# Patient Record
Sex: FEMALE | Race: WHITE | NOT HISPANIC OR LATINO | URBAN - METROPOLITAN AREA
[De-identification: names, ages, dates, MRNs, and addresses within clinical notes are randomized per-mention and may not be internally consistent; named-entity substitution may affect disease eponyms.]

---

## 2018-03-14 ENCOUNTER — RECORDS - HEALTHEAST (OUTPATIENT)
Dept: LAB | Facility: CLINIC | Age: 83
End: 2018-03-14

## 2018-03-15 LAB
ANION GAP SERPL CALCULATED.3IONS-SCNC: 6 MMOL/L (ref 5–18)
BASOPHILS # BLD AUTO: 0 THOU/UL (ref 0–0.2)
BASOPHILS NFR BLD AUTO: 0 % (ref 0–2)
BUN SERPL-MCNC: 18 MG/DL (ref 8–28)
CALCIUM SERPL-MCNC: 9.1 MG/DL (ref 8.5–10.5)
CHLORIDE BLD-SCNC: 98 MMOL/L (ref 98–107)
CHOLEST SERPL-MCNC: 114 MG/DL
CO2 SERPL-SCNC: 31 MMOL/L (ref 22–31)
CREAT SERPL-MCNC: 0.97 MG/DL (ref 0.6–1.1)
EOSINOPHIL # BLD AUTO: 0.3 THOU/UL (ref 0–0.4)
EOSINOPHIL NFR BLD AUTO: 3 % (ref 0–6)
ERYTHROCYTE [DISTWIDTH] IN BLOOD BY AUTOMATED COUNT: 16.5 % (ref 11–14.5)
FASTING STATUS PATIENT QL REPORTED: ABNORMAL
GFR SERPL CREATININE-BSD FRML MDRD: 54 ML/MIN/1.73M2
GLUCOSE BLD-MCNC: 69 MG/DL (ref 70–125)
HCT VFR BLD AUTO: 36.4 % (ref 35–47)
HDLC SERPL-MCNC: 35 MG/DL
HGB BLD-MCNC: 11.5 G/DL (ref 12–16)
LDLC SERPL CALC-MCNC: 64 MG/DL
LYMPHOCYTES # BLD AUTO: 1.7 THOU/UL (ref 0.8–4.4)
LYMPHOCYTES NFR BLD AUTO: 18 % (ref 20–40)
MCH RBC QN AUTO: 30.1 PG (ref 27–34)
MCHC RBC AUTO-ENTMCNC: 31.6 G/DL (ref 32–36)
MCV RBC AUTO: 95 FL (ref 80–100)
MONOCYTES # BLD AUTO: 0.9 THOU/UL (ref 0–0.9)
MONOCYTES NFR BLD AUTO: 9 % (ref 2–10)
NEUTROPHILS # BLD AUTO: 6.4 THOU/UL (ref 2–7.7)
NEUTROPHILS NFR BLD AUTO: 70 % (ref 50–70)
PLATELET # BLD AUTO: 309 THOU/UL (ref 140–440)
PMV BLD AUTO: 9.2 FL (ref 8.5–12.5)
POTASSIUM BLD-SCNC: 4.6 MMOL/L (ref 3.5–5)
RBC # BLD AUTO: 3.82 MILL/UL (ref 3.8–5.4)
SODIUM SERPL-SCNC: 135 MMOL/L (ref 136–145)
TRIGL SERPL-MCNC: 77 MG/DL
TSH SERPL DL<=0.005 MIU/L-ACNC: 2.83 UIU/ML (ref 0.3–5)
VIT B12 SERPL-MCNC: 371 PG/ML (ref 213–816)
WBC: 9.3 THOU/UL (ref 4–11)

## 2018-03-16 LAB — 25(OH)D3 SERPL-MCNC: 41.5 NG/ML (ref 30–80)

## 2018-04-06 ENCOUNTER — RECORDS - HEALTHEAST (OUTPATIENT)
Dept: LAB | Facility: CLINIC | Age: 83
End: 2018-04-06

## 2018-04-06 LAB
FERRITIN SERPL-MCNC: 216 NG/ML (ref 10–130)
HGB BLD-MCNC: 11.9 G/DL (ref 12–16)
IRON SATN MFR SERPL: 16 % (ref 20–50)
IRON SERPL-MCNC: 40 UG/DL (ref 42–175)
TIBC SERPL-MCNC: 256 UG/DL (ref 313–563)
TRANSFERRIN SERPL-MCNC: 205 MG/DL (ref 212–360)

## 2018-05-08 ENCOUNTER — RECORDS - HEALTHEAST (OUTPATIENT)
Dept: LAB | Facility: CLINIC | Age: 83
End: 2018-05-08

## 2018-05-09 LAB — HGB BLD-MCNC: 13.2 G/DL (ref 12–16)

## 2018-07-14 ENCOUNTER — RECORDS - HEALTHEAST (OUTPATIENT)
Dept: LAB | Facility: CLINIC | Age: 83
End: 2018-07-14

## 2018-07-14 LAB
ALBUMIN UR-MCNC: ABNORMAL MG/DL
APPEARANCE UR: ABNORMAL
BACTERIA #/AREA URNS HPF: ABNORMAL HPF
BILIRUB UR QL STRIP: NEGATIVE
CAOX CRY #/AREA URNS HPF: PRESENT /[HPF]
COLOR UR AUTO: YELLOW
GLUCOSE UR STRIP-MCNC: NEGATIVE MG/DL
HGB UR QL STRIP: NEGATIVE
KETONES UR STRIP-MCNC: NEGATIVE MG/DL
LEUKOCYTE ESTERASE UR QL STRIP: ABNORMAL
MUCOUS THREADS #/AREA URNS LPF: ABNORMAL LPF
NITRATE UR QL: NEGATIVE
PH UR STRIP: 6.5 [PH] (ref 4.5–8)
RBC #/AREA URNS AUTO: ABNORMAL HPF
SP GR UR STRIP: 1.02 (ref 1–1.03)
SQUAMOUS #/AREA URNS AUTO: ABNORMAL LPF
UROBILINOGEN UR STRIP-ACNC: ABNORMAL
WBC #/AREA URNS AUTO: ABNORMAL HPF

## 2018-07-15 LAB — BACTERIA SPEC CULT: NORMAL

## 2018-07-16 ENCOUNTER — RECORDS - HEALTHEAST (OUTPATIENT)
Dept: LAB | Facility: CLINIC | Age: 83
End: 2018-07-16

## 2018-07-17 LAB
ALBUMIN SERPL-MCNC: 3 G/DL (ref 3.5–5)
ALP SERPL-CCNC: 73 U/L (ref 45–120)
ALT SERPL W P-5'-P-CCNC: <9 U/L (ref 0–45)
ANION GAP SERPL CALCULATED.3IONS-SCNC: 9 MMOL/L (ref 5–18)
AST SERPL W P-5'-P-CCNC: 16 U/L (ref 0–40)
BILIRUB SERPL-MCNC: 0.5 MG/DL (ref 0–1)
BUN SERPL-MCNC: 17 MG/DL (ref 8–28)
CALCIUM SERPL-MCNC: 9.8 MG/DL (ref 8.5–10.5)
CHLORIDE BLD-SCNC: 97 MMOL/L (ref 98–107)
CO2 SERPL-SCNC: 31 MMOL/L (ref 22–31)
CREAT SERPL-MCNC: 0.92 MG/DL (ref 0.6–1.1)
ERYTHROCYTE [DISTWIDTH] IN BLOOD BY AUTOMATED COUNT: 15.7 % (ref 11–14.5)
GFR SERPL CREATININE-BSD FRML MDRD: 57 ML/MIN/1.73M2
GLUCOSE BLD-MCNC: 62 MG/DL (ref 70–125)
HCT VFR BLD AUTO: 38.9 % (ref 35–47)
HGB BLD-MCNC: 12.8 G/DL (ref 12–16)
MCH RBC QN AUTO: 31.4 PG (ref 27–34)
MCHC RBC AUTO-ENTMCNC: 32.9 G/DL (ref 32–36)
MCV RBC AUTO: 96 FL (ref 80–100)
PLATELET # BLD AUTO: 291 THOU/UL (ref 140–440)
PMV BLD AUTO: 9.5 FL (ref 8.5–12.5)
POTASSIUM BLD-SCNC: 4.2 MMOL/L (ref 3.5–5)
PROT SERPL-MCNC: 7.1 G/DL (ref 6–8)
RBC # BLD AUTO: 4.07 MILL/UL (ref 3.8–5.4)
SODIUM SERPL-SCNC: 137 MMOL/L (ref 136–145)
TSH SERPL DL<=0.005 MIU/L-ACNC: 2.52 UIU/ML (ref 0.3–5)
WBC: 9.2 THOU/UL (ref 4–11)

## 2018-08-15 ENCOUNTER — RECORDS - HEALTHEAST (OUTPATIENT)
Dept: ADMINISTRATIVE | Facility: OTHER | Age: 83
End: 2018-08-15

## 2018-09-05 ENCOUNTER — RECORDS - HEALTHEAST (OUTPATIENT)
Dept: ADMINISTRATIVE | Facility: OTHER | Age: 83
End: 2018-09-05

## 2018-09-14 ENCOUNTER — RECORDS - HEALTHEAST (OUTPATIENT)
Dept: ADMINISTRATIVE | Facility: OTHER | Age: 83
End: 2018-09-14

## 2018-09-26 ENCOUNTER — RECORDS - HEALTHEAST (OUTPATIENT)
Dept: ADMINISTRATIVE | Facility: OTHER | Age: 83
End: 2018-09-26

## 2018-10-01 ENCOUNTER — OFFICE VISIT - HEALTHEAST (OUTPATIENT)
Dept: PULMONOLOGY | Facility: OTHER | Age: 83
End: 2018-10-01

## 2018-10-01 DIAGNOSIS — J47.9 BRONCHIECTASIS WITHOUT ACUTE EXACERBATION (H): ICD-10-CM

## 2018-10-01 RX ORDER — ESCITALOPRAM OXALATE 5 MG/1
5 TABLET ORAL
Status: SHIPPED | COMMUNITY
Start: 2018-10-01

## 2018-10-01 RX ORDER — KETOCONAZOLE 20 MG/ML
1 SHAMPOO TOPICAL WEEKLY
Status: SHIPPED | COMMUNITY
Start: 2018-10-01

## 2018-10-01 RX ORDER — TRIMETHOPRIM 100 MG/1
100 TABLET ORAL DAILY
Status: SHIPPED | COMMUNITY
Start: 2018-10-01

## 2018-10-01 ASSESSMENT — MIFFLIN-ST. JEOR: SCORE: 772.37

## 2018-10-02 ENCOUNTER — COMMUNICATION - HEALTHEAST (OUTPATIENT)
Dept: PULMONOLOGY | Facility: OTHER | Age: 83
End: 2018-10-02

## 2018-10-08 LAB — BACTERIA SPEC CULT: ABNORMAL

## 2018-10-09 ENCOUNTER — RECORDS - HEALTHEAST (OUTPATIENT)
Dept: ADMINISTRATIVE | Facility: OTHER | Age: 83
End: 2018-10-09

## 2018-10-25 ENCOUNTER — RECORDS - HEALTHEAST (OUTPATIENT)
Dept: ADMINISTRATIVE | Facility: OTHER | Age: 83
End: 2018-10-25

## 2018-11-14 LAB
ACID FAST STN SPEC QL: NORMAL
BACTERIA SPEC CULT: NORMAL

## 2019-01-01 ENCOUNTER — RECORDS - HEALTHEAST (OUTPATIENT)
Dept: LAB | Facility: CLINIC | Age: 84
End: 2019-01-01

## 2019-01-01 LAB
ANION GAP SERPL CALCULATED.3IONS-SCNC: 7 MMOL/L (ref 5–18)
BUN SERPL-MCNC: 12 MG/DL (ref 8–28)
CALCIUM SERPL-MCNC: 9.8 MG/DL (ref 8.5–10.5)
CHLORIDE BLD-SCNC: 98 MMOL/L (ref 98–107)
CO2 SERPL-SCNC: 33 MMOL/L (ref 22–31)
CREAT SERPL-MCNC: 1.03 MG/DL (ref 0.6–1.1)
ERYTHROCYTE [DISTWIDTH] IN BLOOD BY AUTOMATED COUNT: 14.6 % (ref 11–14.5)
GFR SERPL CREATININE-BSD FRML MDRD: 50 ML/MIN/1.73M2
GLUCOSE BLD-MCNC: 104 MG/DL (ref 70–125)
HCT VFR BLD AUTO: 43.4 % (ref 35–47)
HGB BLD-MCNC: 13.5 G/DL (ref 12–16)
MCH RBC QN AUTO: 30.8 PG (ref 27–34)
MCHC RBC AUTO-ENTMCNC: 31.1 G/DL (ref 32–36)
MCV RBC AUTO: 99 FL (ref 80–100)
PLATELET # BLD AUTO: 311 THOU/UL (ref 140–440)
PMV BLD AUTO: 9.7 FL (ref 8.5–12.5)
POTASSIUM BLD-SCNC: 4.2 MMOL/L (ref 3.5–5)
RBC # BLD AUTO: 4.39 MILL/UL (ref 3.8–5.4)
SODIUM SERPL-SCNC: 138 MMOL/L (ref 136–145)
TSH SERPL DL<=0.005 MIU/L-ACNC: 4.15 UIU/ML (ref 0.3–5)
VIT B12 SERPL-MCNC: 421 PG/ML (ref 213–816)
WBC: 8.4 THOU/UL (ref 4–11)

## 2020-01-01 ENCOUNTER — HOME CARE/HOSPICE - HEALTHEAST (OUTPATIENT)
Dept: HOSPICE | Facility: HOSPICE | Age: 85
End: 2020-01-01

## 2020-01-01 ENCOUNTER — AMBULATORY - HEALTHEAST (OUTPATIENT)
Dept: HOSPICE | Facility: HOSPICE | Age: 85
End: 2020-01-01

## 2020-01-01 ENCOUNTER — RECORDS - HEALTHEAST (OUTPATIENT)
Dept: ADMINISTRATIVE | Facility: OTHER | Age: 85
End: 2020-01-01

## 2020-01-01 RX ORDER — AMOXICILLIN 250 MG
1 CAPSULE ORAL 2 TIMES DAILY
Status: SHIPPED | COMMUNITY
Start: 2020-01-01

## 2020-01-01 RX ORDER — IPRATROPIUM BROMIDE AND ALBUTEROL SULFATE 2.5; .5 MG/3ML; MG/3ML
3 SOLUTION RESPIRATORY (INHALATION) 4 TIMES DAILY
Status: SHIPPED | COMMUNITY
Start: 2020-01-01

## 2020-01-01 ASSESSMENT — MIFFLIN-ST. JEOR: SCORE: 756.49

## 2021-05-26 VITALS
OXYGEN SATURATION: 90 % | SYSTOLIC BLOOD PRESSURE: 100 MMHG | DIASTOLIC BLOOD PRESSURE: 60 MMHG | RESPIRATION RATE: 28 BRPM | HEART RATE: 80 BPM

## 2021-05-26 VITALS
HEART RATE: 90 BPM | OXYGEN SATURATION: 88 % | SYSTOLIC BLOOD PRESSURE: 90 MMHG | RESPIRATION RATE: 20 BRPM | DIASTOLIC BLOOD PRESSURE: 58 MMHG

## 2021-05-27 VITALS
SYSTOLIC BLOOD PRESSURE: 90 MMHG | OXYGEN SATURATION: 97 % | HEART RATE: 90 BPM | RESPIRATION RATE: 20 BRPM | DIASTOLIC BLOOD PRESSURE: 58 MMHG

## 2021-05-27 VITALS
SYSTOLIC BLOOD PRESSURE: 82 MMHG | OXYGEN SATURATION: 90 % | RESPIRATION RATE: 40 BRPM | HEART RATE: 88 BPM | DIASTOLIC BLOOD PRESSURE: 56 MMHG

## 2021-05-27 VITALS
SYSTOLIC BLOOD PRESSURE: 92 MMHG | RESPIRATION RATE: 20 BRPM | HEART RATE: 98 BPM | DIASTOLIC BLOOD PRESSURE: 64 MMHG | OXYGEN SATURATION: 86 %

## 2021-05-27 VITALS
DIASTOLIC BLOOD PRESSURE: 48 MMHG | HEART RATE: 88 BPM | SYSTOLIC BLOOD PRESSURE: 82 MMHG | RESPIRATION RATE: 32 BRPM | OXYGEN SATURATION: 93 %

## 2021-05-27 VITALS
RESPIRATION RATE: 26 BRPM | HEART RATE: 98 BPM | DIASTOLIC BLOOD PRESSURE: 60 MMHG | SYSTOLIC BLOOD PRESSURE: 98 MMHG | OXYGEN SATURATION: 92 %

## 2021-06-01 VITALS — WEIGHT: 97 LBS | HEIGHT: 61 IN | BODY MASS INDEX: 18.31 KG/M2

## 2021-06-03 VITALS
WEIGHT: 90 LBS | DIASTOLIC BLOOD PRESSURE: 58 MMHG | HEIGHT: 62 IN | RESPIRATION RATE: 18 BRPM | SYSTOLIC BLOOD PRESSURE: 94 MMHG | BODY MASS INDEX: 16.56 KG/M2

## 2021-06-04 VITALS
SYSTOLIC BLOOD PRESSURE: 88 MMHG | WEIGHT: 77 LBS | DIASTOLIC BLOOD PRESSURE: 50 MMHG | HEART RATE: 98 BPM | OXYGEN SATURATION: 92 % | RESPIRATION RATE: 20 BRPM | BODY MASS INDEX: 14.08 KG/M2

## 2021-06-16 PROBLEM — N17.9 ACUTE KIDNEY INJURY (H): Status: ACTIVE | Noted: 2019-01-01

## 2021-06-16 PROBLEM — R79.89 ELEVATED TROPONIN LEVEL: Status: ACTIVE | Noted: 2019-01-01

## 2021-06-16 PROBLEM — J47.9 BRONCHIECTASIS (H): Chronic | Status: ACTIVE | Noted: 2018-10-01

## 2021-06-16 PROBLEM — I27.20 PULMONARY HYPERTENSION (H): Chronic | Status: ACTIVE | Noted: 2019-01-01

## 2021-06-16 PROBLEM — I51.9 DYSFUNCTION OF RIGHT CARDIAC VENTRICLE: Chronic | Status: ACTIVE | Noted: 2019-01-01

## 2021-06-16 PROBLEM — I07.1 SEVERE TRICUSPID REGURGITATION: Chronic | Status: ACTIVE | Noted: 2019-01-01

## 2021-06-16 PROBLEM — I25.10 CORONARY ARTERY CALCIFICATION SEEN ON CT SCAN: Chronic | Status: ACTIVE | Noted: 2019-01-01

## 2021-06-16 PROBLEM — J69.0 ASPIRATION PNEUMONIA (H): Status: ACTIVE | Noted: 2020-01-01

## 2021-06-16 PROBLEM — I50.811: Status: ACTIVE | Noted: 2019-01-01

## 2021-06-20 NOTE — PROGRESS NOTES
Albuterol neb. Given to induce a sputum culture. Tolerated well and was able to produce a sputum sample. Labeled and sent to the lab.

## 2021-06-20 NOTE — PROGRESS NOTES
[facility] Outpatient Consultation    Assessment and Plan: 89 year old female never-smoker with no known pulmonary disorders with a history of CKD, presenting for evaluation of new bronchiectasis on imaging.     We reviewed in detail her scan images, the various possible causes of bronchiectasis and standard work up, in the context of her preferences to pursue very little medically at this time. Lab evaluation is generally aimed at identifying treatable etiologies (such as A1 anti-trypsin deficiency, recurrent infections w IVIG), her lack of symptoms, history, and age at presentation (CT scan 5 years ago did not show bronchiectasis) make most of these unlikely. 50% of bronchiectasis is idiopathic. 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    She does not want to pursue a lab evaluation. Today we attempted to induce sputum, and sent a small amount of phlegm for bacterial, AFB and fungal cultures. I offered a swallow evaluation to assess for silent aspiration, and she notes she would not modify her diet regardless (and this was done in 2013 and was negative).     Given her goals, and lack of symptoms I would prioritize airway clearance to avoid exacerbations/respiratory infections. I provided her with a PEP device today and teaching. Given her weak insp/exp muscles, may consider VEST in the future. If she grows MAC, I would not necessarily consider treatment - presence of respiratory symptoms is a criterion, and I do not think she would tolerate a multi-drug regimen and tx duration frequently excedes 12 months. We can discuss this at our next visit.      PLAN    Airway clearance - albuterol nebulizer two times a day followed by aerobika    Follow-up sputum samples     I will see her back in 2 months - we can augment airway clearance  at that time if needed    I appreciate the opportunity to participate in the care of Ms. Ha.  Please feel free to contact me at any time.      Kimmy Sanchez MD  Pulmonary and Critical Care Medicine  [facility]  Office: [phone]  Pager: [phone]    --------------------------------------    CCx: Abnormal chest imaging    HPI:   89 y F with a history of HTN/HLD, mild cognitive impairment, CKD presents for an evaluation of an abnormal chest CT.    Imaging was obtained as part of an evaluation for weight loss, and revealed diffuse cylindrical bronchiectasis greatest in RML and lingula, and scattered opacities as well. 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     Today Ms. Ha is accompanied by her son. She denies respiratory symptoms - no cough, no shortness of breath. Per her son she does frequently have phlegm in her mouth. She denies s/s of aspiration, GERD. Regarding her weight loss she notes a good appetite, avoids certain foods such as cheese that upset her stomach. She denies a personal history of recurrent respiratory infections, no s/s of connective tissue diseases, and no TB risk factors on my review, with no additional systemic s/s - denies fevers, night sweats, or cough. She is a never-smoker with no prior diagnoses of asthma or COPD. She denies exertional limitations, ambulates with a walker, and participates in aerobic classes at her [facility].    Resting saturation on arrival was 90%, she is 92-94% at rest.    ROS:  A 12-system review was obtained and was negative with the exception of the symptoms endorsed in the history of present illness.    PMH:  CKD  HTN/HLD  Mild cognitive impairment  Anemia  Anxiety  Chronic cystitis    PSH:  Note of prior GB surgery - no further details    Allergies:  No Known Allergies    Family HX:  Breast cancer in 2 sisters  No known FH of pulmonary disorders, autoimmune disorders, CF    Social Hx:  Social History  "    Social History     Marital status:      Spouse name: N/A     Number of children: N/A     Years of education: N/A     Occupational History     Not on file.     Social History Main Topics     Smoking status: Never Smoker     Smokeless tobacco: Never Used     Alcohol use Not on file     Drug use: Not on file     Sexual activity: Not on file     Other Topics Concern     Not on file     Social History Narrative     No narrative on file   Worked as a seamstress  No known environmental/occupational exposures  Has 4 sons, healthy  Lives in [facility]  No recent travel    Current Meds:  Current Outpatient Prescriptions   Medication Sig Dispense Refill     calcium carbonate/vitamin D3 (CALCIUM+D ORAL) Take 1 tablet by mouth.       cranberry 500 mg cap Take by mouth.       escitalopram oxalate (LEXAPRO) 5 MG tablet Take 5 mg by mouth daily.       ketoconazole (NIZORAL) 2 % shampoo Apply topically 2 (two) times a week. Apply to damp skin, lather, leave on 5 minutes, and rinse       melatonin 3 mg Tab tablet Take 3 mg by mouth at bedtime as needed.       multivitamin Liqd solution Take 5 mL by mouth daily.       polyethylene glycol 3350 (MIRALAX ORAL) Take by mouth.       trimethoprim (TRIMPEX) 100 mg tablet Take 100 mg by mouth 2 (two) times a day.       albuterol (PROVENTIL) 2.5 mg /3 mL (0.083 %) nebulizer solution Take 3 mL (2.5 mg total) by nebulization 2 (two) times a day. 60 vial 6     No current facility-administered medications for this visit.      Physical Exam:  /82  Pulse 85  Resp 8  Ht 5' 1\" (1.549 m)  Wt (!) 97 lb (44 kg)  SpO2 90%  Breastfeeding? No  BMI 18.33 kg/m2  Gen: Alert, oriented, no distress, [place]  HEENT: nasal turbinates are unremarkable, no oropharyngeal lesions, no cervical or supraclavicular lymphadenopathy  CV: RRR, no M/G/R, occ PVCs  Resp: Coarse and rhoncherous at right upper lung fields, diminished breath sounds throughout, may be effort-related  Kyphotic  Abd: soft, " nontender, no palpable organomegaly  Skin: no apparent rashes  Ext: no cyanosis, clubbing or edema  Neuro: alert, nonfocal    Labs:  Reviewed  Abs eos 300    Imaging studies:  9/14/18 CT Chest: (non contrast)  Images personally reviewed  Read:  Scattered cylindrical bronchiectasis throughout lungs, greatest in the bases and RML and lingula  Scattered irregular opacities, greatest in MORGAN  Mediastinal adenopathy  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